# Patient Record
Sex: FEMALE | Race: AMERICAN INDIAN OR ALASKA NATIVE | NOT HISPANIC OR LATINO | Employment: UNEMPLOYED | URBAN - METROPOLITAN AREA
[De-identification: names, ages, dates, MRNs, and addresses within clinical notes are randomized per-mention and may not be internally consistent; named-entity substitution may affect disease eponyms.]

---

## 2017-09-14 ENCOUNTER — TELEPHONE (OUTPATIENT)
Dept: FAMILY MEDICINE | Facility: CLINIC | Age: 37
End: 2017-09-14

## 2017-09-14 DIAGNOSIS — E89.0 POSTABLATIVE HYPOTHYROIDISM: Primary | ICD-10-CM

## 2017-09-14 NOTE — TELEPHONE ENCOUNTER
----- Message from Shanice Huitron sent at 9/14/2017  9:02 AM CDT -----  Please call pt at [phone] .. Asking to have lab orders to check thyroid / had her name brand prescription changed to generic

## 2017-09-15 ENCOUNTER — TELEPHONE (OUTPATIENT)
Dept: FAMILY MEDICINE | Facility: CLINIC | Age: 37
End: 2017-09-15

## 2017-09-15 NOTE — TELEPHONE ENCOUNTER
----- Message from Helene Disla sent at 9/15/2017  8:21 AM CDT -----  Contact: Patient  Jama, patient [phone], Calling to having her lab orders faxed over to [facility] at [address] [place] [address] in [place], phone [phone]. Please advise. Patient waiting. Thanks.

## 2017-09-16 LAB
T4 FREE SERPL-MCNC: 1.7 NG/DL (ref 0.8–1.8)
TSH SERPL-ACNC: 0.78 MIU/L

## 2017-12-13 RX ORDER — LEVOTHYROXINE SODIUM 100 UG/1
TABLET ORAL
Qty: 90 TABLET | Refills: 3 | Status: SHIPPED | OUTPATIENT
Start: 2017-12-13 | End: 2018-12-19 | Stop reason: SDUPTHER

## 2018-07-10 ENCOUNTER — OFFICE VISIT (OUTPATIENT)
Dept: FAMILY MEDICINE | Facility: CLINIC | Age: 38
End: 2018-07-10
Payer: COMMERCIAL

## 2018-07-10 VITALS
DIASTOLIC BLOOD PRESSURE: 70 MMHG | HEART RATE: 76 BPM | SYSTOLIC BLOOD PRESSURE: 102 MMHG | WEIGHT: 118.63 LBS | HEIGHT: 60 IN | TEMPERATURE: 99 F | BODY MASS INDEX: 23.29 KG/M2

## 2018-07-10 DIAGNOSIS — E03.9 ACQUIRED HYPOTHYROIDISM: ICD-10-CM

## 2018-07-10 DIAGNOSIS — N30.01 ACUTE CYSTITIS WITH HEMATURIA: Primary | ICD-10-CM

## 2018-07-10 LAB
BILIRUB SERPL-MCNC: NORMAL MG/DL
BLOOD URINE, POC: NORMAL
COLOR, POC UA: NORMAL
GLUCOSE UR QL STRIP: NORMAL
KETONES UR QL STRIP: NORMAL
LEUKOCYTE ESTERASE URINE, POC: NORMAL
NITRITE, POC UA: NORMAL
PH, POC UA: 7
PROTEIN, POC: NORMAL
SPECIFIC GRAVITY, POC UA: 1
UROBILINOGEN, POC UA: NORMAL

## 2018-07-10 PROCEDURE — 99214 OFFICE O/P EST MOD 30 MIN: CPT | Mod: 25,S$GLB,, | Performed by: INTERNAL MEDICINE

## 2018-07-10 PROCEDURE — 81002 URINALYSIS NONAUTO W/O SCOPE: CPT | Mod: S$GLB,,, | Performed by: INTERNAL MEDICINE

## 2018-07-10 PROCEDURE — 87086 URINE CULTURE/COLONY COUNT: CPT

## 2018-07-10 PROCEDURE — 99999 PR PBB SHADOW E&M-EST. PATIENT-LVL III: CPT | Mod: PBBFAC,,, | Performed by: INTERNAL MEDICINE

## 2018-07-10 PROCEDURE — 87088 URINE BACTERIA CULTURE: CPT

## 2018-07-10 PROCEDURE — 87077 CULTURE AEROBIC IDENTIFY: CPT

## 2018-07-10 PROCEDURE — 87186 SC STD MICRODIL/AGAR DIL: CPT

## 2018-07-10 RX ORDER — SULFAMETHOXAZOLE AND TRIMETHOPRIM 800; 160 MG/1; MG/1
1 TABLET ORAL 2 TIMES DAILY
Qty: 6 TABLET | Refills: 0 | Status: SHIPPED | OUTPATIENT
Start: 2018-07-10 | End: 2018-07-13

## 2018-07-10 NOTE — PROGRESS NOTES
"Assessment and Plan:    1. Acute cystitis with hematuria  Suspect UTI based on patient symptoms and abnormal POCT urine, ++ leukocytes, hematuria. Empiric treatment with antibiotic therapy.   Urine culture pending.  May take OTC AZO for symptomatic relief. Increase water intake  Symptoms should improve in 24-48 hours after beginning antibiotic.   RTC if symptoms do not improve or worsen.  - sulfamethoxazole-trimethoprim 800-160mg (BACTRIM DS) 800-160 mg Tab; Take 1 tablet by mouth 2 (two) times daily. for 3 days  Dispense: 6 tablet; Refill: 0  - POCT URINE DIPSTICK WITHOUT MICROSCOPE  - Urine culture    2. Acquired hypothyroidism  TSH, free T4 stable in 9/2017.   Continue current medication regimen.  Denies needing refills of synthroid at this time.  Recheck TSH  - TSH; Future  - TSH      ______________________________________________________________________  Subjective:    Chief Complaint:    bladder pressure    HPI:    Jama is a 37 y.o. year old who is new to me. She started having "bladder pressure" yesterday morning. She reports clear urine with some blood streaks. Throughout the day the symptoms worsened. Pain was 7-8/10. Denies dysuria, frequency and urgency. She increased her water intake in which she had improvement of bladder symptoms. This morning she reports her bladder "is not hurting like it was yesterday."  She did not take anything OTC for her symptoms.     Hypothyroidism- Pt requesting re-check of her TSH. She denies symptoms such as fatigue, cold or heat intolerance, hair loss, weight gain or loss. She has been getting her synthroid refills through her allergist. Her last TSH, free T4 was drawn 9/2017.     Medications:  Current Outpatient Prescriptions on File Prior to Visit   Medication Sig Dispense Refill    levothyroxine (SYNTHROID) 100 MCG tablet TAKE 1 TABLET BY MOUTH ONCE DAILY 90 tablet 3    [DISCONTINUED] selenium sulfide 2.5 % Lotn Apply 1 application topically every 7 days. 120 mL 5 "     No current facility-administered medications on file prior to visit.        Review of Systems:  Review of Systems   Constitutional: Negative for chills, fatigue and fever.   Respiratory: Negative for cough and shortness of breath.    Cardiovascular: Negative for chest pain and palpitations.   Gastrointestinal: Negative for abdominal pain, nausea and vomiting.   Genitourinary: Positive for hematuria, pelvic pain (Yesterday. Has resolved today) and urgency. Negative for difficulty urinating, dysuria, frequency, vaginal bleeding and vaginal discharge.   Skin: Negative for rash and wound.   Neurological: Positive for headaches (yesterday. Resolved today.). Negative for dizziness and light-headedness.   Psychiatric/Behavioral: Negative for sleep disturbance.       Past Medical History:  Past Medical History:   Diagnosis Date    Thyroid disease 2007    [facility] tx [facility]       Objective:    Vitals:  Vitals:    07/10/18 0947   BP: 102/70   Pulse: 76   Temp: 98.6 °F (37 °C)   Weight: 53.8 kg (118 lb 9.7 oz)   Height: 5' (1.524 m)   PainSc:   3   PainLoc: Vagina       Physical Exam   Constitutional: She is oriented to person, place, and time. She appears well-developed and well-nourished. No distress.   HENT:   Head: Normocephalic and atraumatic.   Eyes: EOM are normal. Pupils are equal, round, and reactive to light.   Neck: Normal range of motion.   Cardiovascular: Normal rate and regular rhythm.    Pulmonary/Chest: Effort normal and breath sounds normal.   Abdominal: Soft. Normal appearance and bowel sounds are normal. She exhibits no distension and no mass. There is tenderness (RLQ). There is no rebound, no guarding and no CVA tenderness.   Musculoskeletal: Normal range of motion. She exhibits no edema.   Neurological: She is alert and oriented to person, place, and time.   Skin: Skin is warm and dry.   Psychiatric: She has a normal mood and affect. Her behavior is normal.       Data:  Previous TSH, free T4 reviewed and  were stable. POCT urine reviewed with patient.       Shobha Barragan MD  Internal Medicine

## 2018-07-13 ENCOUNTER — TELEPHONE (OUTPATIENT)
Dept: FAMILY MEDICINE | Facility: CLINIC | Age: 38
End: 2018-07-13

## 2018-07-13 ENCOUNTER — NURSE TRIAGE (OUTPATIENT)
Dept: ADMINISTRATIVE | Facility: CLINIC | Age: 38
End: 2018-07-13

## 2018-07-13 DIAGNOSIS — N30.00 ACUTE CYSTITIS WITHOUT HEMATURIA: Primary | ICD-10-CM

## 2018-07-13 LAB — BACTERIA UR CULT: NORMAL

## 2018-07-13 RX ORDER — NITROFURANTOIN (MACROCRYSTALS) 100 MG/1
100 CAPSULE ORAL EVERY 12 HOURS
Qty: 10 CAPSULE | Refills: 0 | Status: SHIPPED | OUTPATIENT
Start: 2018-07-13 | End: 2018-07-16 | Stop reason: SDUPTHER

## 2018-07-13 NOTE — TELEPHONE ENCOUNTER
"    Reason for Disposition   [1] Request for URGENT new prescription or refill of "essential" medication (i.e., likelihood of harm to patient if not taken) AND [2] triager unable to fill per unit policy    Protocols used: ST MEDICATION QUESTION CALL-A-SCOTTIE Yun called again trying to reach Dr Barragan for results of test and new antibiotic, if one is needed.  Assured her I will message Dr Barragan, with request to call her asap at [phone], as in a chart note, MD indicates need to begin her on new antibiotic before the weekend.  Please contact caller directly with any additional care advice.  MD note copied below:      Shobha Barragan MD   7/13/18 8:24 AM   Note      Urine culture shows that she has a UTI that is resistant to the bactrim we had treated her with. Tried to call patient, no answer, left voicemail. We will need to send another antibiotic (macrobid). Please call patient again today aout results, want to try to reach her before the weekend.       This encounter is not signed. The conversation may still be ongoing.     "

## 2018-07-13 NOTE — TELEPHONE ENCOUNTER
Urine culture shows that she has a UTI that is resistant to the bactrim we had treated her with. Tried to call patient, no answer, left voicemail. We will need to send another antibiotic (macrobid). Please call patient again today aout results, want to try to reach her before the weekend.

## 2018-07-13 NOTE — TELEPHONE ENCOUNTER
Called again, no answer. Left voicemail. If she calls back, let her know that I sent a prescription for macrobid to the pharmacy

## 2018-07-13 NOTE — TELEPHONE ENCOUNTER
----- Message from Yelena Bronson sent at 7/13/2018 11:49 AM CDT -----  Contact: patient   Patient calling to speak to the Nurse. Please advise. Call to pod. No answer. Patient states that she was seen by Dr. Barragan on 7/10/18 and prescribed a medication. She says that she has completed this medication but now is experiencing severe cramps after eating.   Call back    Thanks!

## 2018-07-16 ENCOUNTER — TELEPHONE (OUTPATIENT)
Dept: FAMILY MEDICINE | Facility: CLINIC | Age: 38
End: 2018-07-16

## 2018-07-16 DIAGNOSIS — N30.00 ACUTE CYSTITIS WITHOUT HEMATURIA: ICD-10-CM

## 2018-07-16 RX ORDER — NITROFURANTOIN (MACROCRYSTALS) 100 MG/1
100 CAPSULE ORAL EVERY 12 HOURS
Qty: 10 CAPSULE | Refills: 0 | Status: SHIPPED | OUTPATIENT
Start: 2018-07-16 | End: 2018-07-21

## 2018-07-16 NOTE — TELEPHONE ENCOUNTER
----- Message from Jacqueline Hernandez sent at 7/16/2018 12:41 PM CDT -----  Type:  RX Request    Who Called:  Patient  RX Name and Strength:  antibiotic  Preferred Pharmacy with phone number:  [facility] at [phone] (Phone)  Local or Mail Order: local  Ordering Provider:    Best Call Back Number:  [phone]  Additional Information:  Patient is currently in the store waiting for medication

## 2018-07-16 NOTE — TELEPHONE ENCOUNTER
Spoke w/ pt advised of the below information and to start new antibiotic today, increase fluids and f/u PRN. 15 min spent on phone as pt continually swore that no one has clled or left messages.

## 2018-07-16 NOTE — TELEPHONE ENCOUNTER
----- Message from Elizabeth Tyler sent at 7/16/2018 10:09 AM CDT -----  Type:  Patient Returning Call    Who Called:  Patient  Who Left Message for Patient:  Rea  Does the patient know what this is regarding?:  Medication  Best Call Back Number:  [phone]  Additional Information:  Patient states that she has not had a call back from office but it is documented that office tried several times on 7/13/18. She states that office did not call. Please call to advise.

## 2018-08-08 ENCOUNTER — TELEPHONE (OUTPATIENT)
Dept: FAMILY MEDICINE | Facility: CLINIC | Age: 38
End: 2018-08-08

## 2018-08-08 DIAGNOSIS — E89.0 POSTABLATIVE HYPOTHYROIDISM: Primary | ICD-10-CM

## 2018-08-08 NOTE — TELEPHONE ENCOUNTER
Spoke w/ pt. Her TSH orders went to [facility] and she didn't realize they needed to go to [facility]. Please associate and sign. Pt aware this will be done today and that she co to [facility] for this tomorrow.

## 2018-08-08 NOTE — TELEPHONE ENCOUNTER
----- Message from Pushpa Garner sent at 8/8/2018 10:37 AM CDT -----  Contact: self  Needs to make sure lab orders went to [facility]. Please call back at [phone] (home)

## 2018-08-10 LAB — TSH SERPL-ACNC: 3.07 MIU/L

## 2018-12-19 RX ORDER — LEVOTHYROXINE SODIUM 100 UG/1
TABLET ORAL
Qty: 90 TABLET | Refills: 1 | Status: SHIPPED | OUTPATIENT
Start: 2018-12-19 | End: 2019-07-03 | Stop reason: SDUPTHER

## 2019-07-03 ENCOUNTER — TELEPHONE (OUTPATIENT)
Dept: FAMILY MEDICINE | Facility: CLINIC | Age: 39
End: 2019-07-03

## 2019-07-03 DIAGNOSIS — E89.0 POSTABLATIVE HYPOTHYROIDISM: Primary | ICD-10-CM

## 2019-07-03 NOTE — LETTER
July 8, 2019    Jama Mills  [id number] [place] [id number]             [facility] [address]  [address]  [place] [address]  Phone: [phone]  Fax: [phone] Dear Mrs. Mills:    Please call the clinic to set up an appointment with PCP and labs. [phone].     If you have any questions or concerns, please don't hesitate to call.    Sincerely,        Zoe Graves LPN

## 2019-07-04 RX ORDER — LEVOTHYROXINE SODIUM 100 UG/1
TABLET ORAL
Qty: 90 TABLET | Refills: 0 | Status: SHIPPED | OUTPATIENT
Start: 2019-07-04 | End: 2019-09-09 | Stop reason: SDUPTHER

## 2019-07-05 ENCOUNTER — TELEPHONE (OUTPATIENT)
Dept: FAMILY MEDICINE | Facility: CLINIC | Age: 39
End: 2019-07-05

## 2019-07-05 DIAGNOSIS — Z00.00 ROUTINE GENERAL MEDICAL EXAMINATION AT A HEALTH CARE FACILITY: Primary | ICD-10-CM

## 2019-07-05 NOTE — TELEPHONE ENCOUNTER
----- Message from Sher Tracy sent at 7/5/2019 10:34 AM CDT -----  Contact: patient  Type: Needs Medical Advice    Who Called:  Patient  Symptoms (please be specific):    How long has patient had these symptoms:    Pharmacy name and phone #:    Best Call Back Number: [phone]  Additional Information: requesting blood work complete panel with TSH,be placed in epic

## 2019-07-17 ENCOUNTER — PATIENT OUTREACH (OUTPATIENT)
Dept: ADMINISTRATIVE | Facility: HOSPITAL | Age: 39
End: 2019-07-17

## 2019-09-09 PROBLEM — H73.91: Status: ACTIVE | Noted: 2019-09-09

## 2019-09-09 PROBLEM — L50.9 URTICARIA: Status: ACTIVE | Noted: 2019-09-09

## 2020-01-03 DIAGNOSIS — E03.4 HYPOTHYROIDISM DUE TO ACQUIRED ATROPHY OF THYROID: ICD-10-CM

## 2020-01-04 RX ORDER — LEVOTHYROXINE SODIUM 100 UG/1
TABLET ORAL
Qty: 90 TABLET | Refills: 0 | Status: SHIPPED | OUTPATIENT
Start: 2020-01-04 | End: 2020-10-07 | Stop reason: SDUPTHER

## 2021-03-18 PROBLEM — H72.91 PERFORATION OF RIGHT TYMPANIC MEMBRANE: Status: ACTIVE | Noted: 2019-09-09

## 2021-05-10 ENCOUNTER — PATIENT MESSAGE (OUTPATIENT)
Dept: RESEARCH | Facility: HOSPITAL | Age: 41
End: 2021-05-10

## 2022-12-07 PROBLEM — H72.91 PERFORATION OF RIGHT TYMPANIC MEMBRANE: Status: RESOLVED | Noted: 2019-09-09 | Resolved: 2022-12-07

## 2022-12-07 PROBLEM — E89.0 POSTABLATIVE HYPOTHYROIDISM: Status: ACTIVE | Noted: 2022-12-07

## 2022-12-07 PROBLEM — L50.9 URTICARIA: Status: RESOLVED | Noted: 2019-09-09 | Resolved: 2022-12-07

## 2022-12-07 PROBLEM — I10 ESSENTIAL HYPERTENSION: Status: ACTIVE | Noted: 2022-12-07
